# Patient Record
Sex: FEMALE | Race: WHITE | NOT HISPANIC OR LATINO | Employment: OTHER | URBAN - METROPOLITAN AREA
[De-identification: names, ages, dates, MRNs, and addresses within clinical notes are randomized per-mention and may not be internally consistent; named-entity substitution may affect disease eponyms.]

---

## 2017-07-31 ENCOUNTER — ALLSCRIPTS OFFICE VISIT (OUTPATIENT)
Dept: OTHER | Facility: OTHER | Age: 82
End: 2017-07-31

## 2017-07-31 LAB
BILIRUB UR QL STRIP: NEGATIVE
CLARITY UR: NORMAL
COLOR UR: YELLOW
GLUCOSE (HISTORICAL): NEGATIVE
HGB UR QL STRIP.AUTO: NORMAL
KETONES UR STRIP-MCNC: NEGATIVE MG/DL
LEUKOCYTE ESTERASE UR QL STRIP: NORMAL
NITRITE UR QL STRIP: NEGATIVE
PH UR STRIP.AUTO: 7 [PH]
PROT UR STRIP-MCNC: NEGATIVE MG/DL
SP GR UR STRIP.AUTO: 1.01
UROBILINOGEN UR QL STRIP.AUTO: 0.2

## 2018-01-13 VITALS
WEIGHT: 164 LBS | HEIGHT: 66 IN | BODY MASS INDEX: 26.36 KG/M2 | DIASTOLIC BLOOD PRESSURE: 58 MMHG | SYSTOLIC BLOOD PRESSURE: 134 MMHG

## 2021-01-17 ENCOUNTER — TELEPHONE (OUTPATIENT)
Dept: OTHER | Facility: OTHER | Age: 86
End: 2021-01-17

## 2021-01-18 ENCOUNTER — NURSING HOME VISIT (OUTPATIENT)
Dept: GERIATRICS | Facility: OTHER | Age: 86
End: 2021-01-18
Payer: MEDICARE

## 2021-01-18 DIAGNOSIS — M19.011 BILATERAL SHOULDER REGION ARTHRITIS: ICD-10-CM

## 2021-01-18 DIAGNOSIS — N17.9 AKI (ACUTE KIDNEY INJURY) (HCC): ICD-10-CM

## 2021-01-18 DIAGNOSIS — N12 PYELONEPHRITIS: ICD-10-CM

## 2021-01-18 DIAGNOSIS — M19.012 BILATERAL SHOULDER REGION ARTHRITIS: ICD-10-CM

## 2021-01-18 DIAGNOSIS — S42.201D CLOSED FRACTURE OF PROXIMAL END OF RIGHT HUMERUS WITH ROUTINE HEALING, UNSPECIFIED FRACTURE MORPHOLOGY, SUBSEQUENT ENCOUNTER: Primary | ICD-10-CM

## 2021-01-18 DIAGNOSIS — N18.32 STAGE 3B CHRONIC KIDNEY DISEASE (HCC): ICD-10-CM

## 2021-01-18 DIAGNOSIS — D64.9 ANEMIA, UNSPECIFIED TYPE: ICD-10-CM

## 2021-01-18 DIAGNOSIS — K21.9 GASTROESOPHAGEAL REFLUX DISEASE, UNSPECIFIED WHETHER ESOPHAGITIS PRESENT: ICD-10-CM

## 2021-01-18 DIAGNOSIS — C92.10 CML (CHRONIC MYELOCYTIC LEUKEMIA) (HCC): ICD-10-CM

## 2021-01-18 DIAGNOSIS — N13.5 URETERAL STRICTURE, RIGHT: ICD-10-CM

## 2021-01-18 PROBLEM — R60.0 EDEMA OF BOTH LEGS: Status: ACTIVE | Noted: 2018-12-17

## 2021-01-18 PROBLEM — K59.1 FUNCTIONAL DIARRHEA: Status: ACTIVE | Noted: 2020-04-02

## 2021-01-18 PROBLEM — G89.29 CHRONIC PAIN OF BOTH SHOULDERS: Status: ACTIVE | Noted: 2018-04-02

## 2021-01-18 PROBLEM — M25.512 CHRONIC PAIN OF BOTH SHOULDERS: Status: ACTIVE | Noted: 2018-04-02

## 2021-01-18 PROBLEM — N30.20 CHRONIC CYSTITIS: Status: ACTIVE | Noted: 2017-07-31

## 2021-01-18 PROBLEM — M25.511 CHRONIC PAIN OF BOTH SHOULDERS: Status: ACTIVE | Noted: 2018-04-02

## 2021-01-18 PROBLEM — F41.8 SITUATIONAL ANXIETY: Status: ACTIVE | Noted: 2017-09-01

## 2021-01-18 PROBLEM — N18.31 CHRONIC KIDNEY DISEASE (CKD) STAGE G3A/A1, MODERATELY DECREASED GLOMERULAR FILTRATION RATE (GFR) BETWEEN 45-59 ML/MIN/1.73 SQUARE METER AND ALBUMINURIA CREATININE RATIO LESS THAN 30 MG/G (HCC): Status: ACTIVE | Noted: 2018-05-27

## 2021-01-18 PROBLEM — M51.36 LUMBAR DEGENERATIVE DISC DISEASE: Status: ACTIVE | Noted: 2018-05-27

## 2021-01-18 PROBLEM — S42.309A HUMERUS FRACTURE: Status: ACTIVE | Noted: 2020-12-30

## 2021-01-18 PROBLEM — M54.50 BACK PAIN AT L4-L5 LEVEL: Status: ACTIVE | Noted: 2018-05-27

## 2021-01-18 PROCEDURE — 99306 1ST NF CARE HIGH MDM 50: CPT | Performed by: INTERNAL MEDICINE

## 2021-01-18 RX ORDER — FERROUS SULFATE 325(65) MG
325 TABLET ORAL EVERY OTHER DAY
COMMUNITY
Start: 2020-12-11 | End: 2021-03-11

## 2021-01-18 RX ORDER — CETIRIZINE HYDROCHLORIDE 10 MG/1
10 TABLET ORAL DAILY
COMMUNITY

## 2021-01-18 RX ORDER — DIPHENOXYLATE HYDROCHLORIDE AND ATROPINE SULFATE 2.5; .025 MG/1; MG/1
1 TABLET ORAL 4 TIMES DAILY PRN
COMMUNITY
End: 2021-02-08

## 2021-01-18 RX ORDER — OXYCODONE HYDROCHLORIDE 5 MG/1
2.5 TABLET ORAL EVERY 6 HOURS PRN
COMMUNITY
End: 2021-02-01

## 2021-01-18 RX ORDER — AMOXICILLIN 250 MG
2 CAPSULE ORAL DAILY PRN
COMMUNITY

## 2021-01-18 RX ORDER — MULTIVIT WITH MINERALS/LUTEIN
250 TABLET ORAL 2 TIMES DAILY
COMMUNITY
Start: 2021-01-16 | End: 2022-01-16

## 2021-01-18 RX ORDER — GABAPENTIN 100 MG/1
100 CAPSULE ORAL 3 TIMES DAILY
COMMUNITY
Start: 2020-10-20 | End: 2021-10-20

## 2021-01-18 RX ORDER — FLUOCINOLONE ACETONIDE 0.1 MG/ML
SOLUTION TOPICAL 2 TIMES DAILY
COMMUNITY

## 2021-01-18 RX ORDER — CHOLECALCIFEROL (VITAMIN D3) 10 MCG
1 TABLET ORAL DAILY
COMMUNITY

## 2021-01-18 RX ORDER — DIPHENOXYLATE HYDROCHLORIDE AND ATROPINE SULFATE 2.5; .025 MG/1; MG/1
1 TABLET ORAL DAILY
COMMUNITY

## 2021-01-18 RX ORDER — IMATINIB MESYLATE 100 MG/1
2 TABLET, FILM COATED ORAL DAILY
COMMUNITY

## 2021-01-18 RX ORDER — OMEPRAZOLE 20 MG/1
20 CAPSULE, DELAYED RELEASE ORAL DAILY
COMMUNITY

## 2021-01-18 RX ORDER — CARBOXYMETHYLCELLULOSE SODIUM 5 MG/ML
1 SOLUTION/ DROPS OPHTHALMIC 3 TIMES DAILY PRN
COMMUNITY

## 2021-01-18 NOTE — ASSESSMENT & PLAN NOTE
Possibly multifactorial  Received 2 units of transfusion and hb on discharge was 7 9  Will repeat and monitor levels  No current signs of bleeding

## 2021-01-18 NOTE — ASSESSMENT & PLAN NOTE
Had stent placed which also may have been cause of worsening kidney function  Follow up scheduled with urology

## 2021-01-18 NOTE — ASSESSMENT & PLAN NOTE
Cr Baseline was stated to be 1 2-1 5 but in the hospital was ranging in the 2-3 but discharged with 2 06   Will repeat to monitor

## 2021-01-18 NOTE — PROGRESS NOTES
[facility] notes  SHORT TERM REHAB       NAME: Meena Mast  AGE: 80 y o  SEX: female    DATE OF ENCOUNTER: 1/18/2021    Assessment and Plan   Humerus fracture  Non WB for now  Continue with pain management  Continue follow up as scheduled with orthopedics  Rehab consult  Continue monitoring for pain     CKD (chronic kidney disease)  Cr Baseline was stated to be 1 2-1 5 but in the hospital was ranging in the 2-3 but discharged with 2 06   Will repeat to monitor    DEJA (acute kidney injury) ([facility] [address] )  Will repeat to monitor since decline from baseline to 2 06    Pyelonephritis  Treated with antibiotics in the hospital and completed course  Continue monitoring symptoms     Ureteral stricture, right  Had stent placed which also may have been cause of worsening kidney function  Follow up scheduled with urology     CML (chronic myelocytic leukemia) ([facility] [address] )  Continue current meds  Continue follow up as scheduled with oncology    Anemia  Possibly multifactorial  Received 2 units of transfusion and hb on discharge was 7 9  Will repeat and monitor levels  No current signs of bleeding    Bilateral shoulder region arthritis  Has limited range of motion  Continue current pain meds  Therapy to evaluate    Esophageal reflux  Continue on PPI   Continue monitoring symptoms       Chief Complaint     Right arm pain    History of Present Illness     79 yo female seen for admission to [address] [place] [address] after hospitalization  Patient stated that she was in the elevator when she lost her balance and fell  She then was not able to get up and was having severe pain in the right arm  She pressed her life alert at which point police and ambulance came and took her to the hospital  She was then found to have a fracture and managed for it  She then was also found to have worsening kidney function when they found pyelonephritis and ureter stricture which she received antibiotics for along with a stent placement   She also was found to be more anemic then her baseline requiring 2 units of blood  Reviewed CT of the abdomen, right shoulder and lab reports from the hospital records  PMHx     Past Medical History:   Diagnosis Date    Anemia     Aortic stenosis     Arthritis     CML (chronic myelocytic leukemia) (HCC)     DJD (degenerative joint disease)     GERD (gastroesophageal reflux disease)     Hepatic steatosis     Hyperlipidemia     Hypertension     Kidney stone     Sacral fracture (HCC)     Thyroid nodule      Past Surgical History:   Procedure Laterality Date    CATARACT EXTRACTION      KNEE ARTHROPLASTY Bilateral      Family History   Problem Relation Age of Onset    Heart disease Father     Hypertension Brother      Social History     Socioeconomic History    Marital status:       Spouse name: None    Number of children: None    Years of education: None    Highest education level: None   Occupational History    None   Social Needs    Financial resource strain: None    Food insecurity     Worry: None     Inability: None    Transportation needs     Medical: None     Non-medical: None   Tobacco Use    Smoking status: Never Smoker    Smokeless tobacco: Never Used   Substance and Sexual Activity    Alcohol use: Never     Frequency: Never    Drug use: Never    Sexual activity: Never   Lifestyle    Physical activity     Days per week: None     Minutes per session: None    Stress: None   Relationships    Social connections     Talks on phone: None     Gets together: None     Attends Voodoo service: None     Active member of club or organization: None     Attends meetings of clubs or organizations: None     Relationship status: None    Intimate partner violence     Fear of current or ex partner: None     Emotionally abused: None     Physically abused: None     Forced sexual activity: None   Other Topics Concern    None   Social History Narrative    None     Allergies   Allergen Reactions    Nitrofurantoin Other (See Comments)    Sulfamethoxazole-Trimethoprim Other (See Comments)     arf     Amoxicillin-Pot Clavulanate Rash    Levofloxacin Rash       Review of Systems     Pain in the right arm  Constipated  Decreased ROM of both shoulders  All other review of system negative        Objective   Vital signs:  BP: 154/92  HR: 100  RR: 16  TEMP: 98 4F  SAT : 99% on RA    PHYSICAL EXAM:  GENERAL: no acute distress  SKIN: warm, dry, no rash, no cyanosis  HEENT: normocephalic, atraumatic, no JVD, no Thyromegaly, no lymphadenopathy  LUNGS: CTA, no wheezing, no rales, expanded equally, no chest tenderness   HEART: normal rhythm, normal rate, systolic murmur, no gallop  ABDOMEN: soft non tender non distended bs+, no guarding or rebound tenderness  :  no suprapubic tenderness  MUSCULOSKELETAL: sling on the right arm, strength about 4+/5 all extremities except right arm not examined due to fracture, decreased ROM of both shoulders, no edema, no calf tenderness  NEUROLOGY: awake, alert, Ox3, able to recall 3/3 object  CN2-12 intact  PSYCH: cooperative, pleasant  Pertinent Laboratory/Diagnostic Studies:  Recent labs and diagnostic tests reviewed in nursing home EMR    Current Medications   Medications reviewed and signed off on nursing home EMR

## 2021-01-18 NOTE — ASSESSMENT & PLAN NOTE
Non WB for now  Continue with pain management  Continue follow up as scheduled with orthopedics  Rehab consult  Continue monitoring for pain

## 2021-01-25 ENCOUNTER — NURSING HOME VISIT (OUTPATIENT)
Dept: GERIATRICS | Facility: OTHER | Age: 86
End: 2021-01-25
Payer: MEDICARE

## 2021-01-25 DIAGNOSIS — N39.0 RECURRENT UTI: ICD-10-CM

## 2021-01-25 DIAGNOSIS — D64.9 ANEMIA, UNSPECIFIED TYPE: ICD-10-CM

## 2021-01-25 DIAGNOSIS — S42.201D CLOSED FRACTURE OF PROXIMAL END OF RIGHT HUMERUS WITH ROUTINE HEALING, UNSPECIFIED FRACTURE MORPHOLOGY, SUBSEQUENT ENCOUNTER: Primary | ICD-10-CM

## 2021-01-25 DIAGNOSIS — K59.1 FUNCTIONAL DIARRHEA: ICD-10-CM

## 2021-01-25 PROCEDURE — 99309 SBSQ NF CARE MODERATE MDM 30: CPT | Performed by: INTERNAL MEDICINE

## 2021-01-25 RX ORDER — POLYETHYLENE GLYCOL 3350 17 G/17G
17 POWDER, FOR SOLUTION ORAL DAILY PRN
COMMUNITY

## 2021-01-25 NOTE — ASSESSMENT & PLAN NOTE
Hb is 8 1 on 1/25/21 will repeat in about 2 weeks if patient still here hb at the hospital was 7 9 after transfusion  Continue iron supplement

## 2021-01-25 NOTE — ASSESSMENT & PLAN NOTE
Repeat xray scheduled and follow up scheduled for this week with orthopedics  Continue current meds   Continue with therapy

## 2021-01-25 NOTE — PROGRESS NOTES
[facility] [address] notes  SHORT TERM REHAB       NAME: Suellen Mast  AGE: 80 y o  SEX: female    DATE OF ENCOUNTER: 1/25/2021    Assessment and Plan   Humerus fracture  Repeat xray scheduled and follow up scheduled for this week with orthopedics  Continue current meds   Continue with therapy       Recurrent UTI  Complaints of burning and frequency  Will get a UA and urine culture and encourage to take in more fluids    Anemia  Hb is 8 1 on 1/25/21 will repeat in about 2 weeks if patient still here hb at the hospital was 7 9 after transfusion  Continue iron supplement     Functional diarrhea  Will change senna to prn   Continue monitoring symptoms       Chief Complaint     Burning and frequency of urine    History of Present Illness     81 yo female seen for follow up  Patient continues with therapy  Patient seen for right humerus fracture, anemia, diarrhea and urinary burning  Reviewed nursing notes since last visit  PMHx     Past Medical History:   Diagnosis Date    Anemia     Aortic stenosis     Arthritis     CML (chronic myelocytic leukemia) (HCC)     DJD (degenerative joint disease)     GERD (gastroesophageal reflux disease)     Hepatic steatosis     Hyperlipidemia     Hypertension     Kidney stone     Sacral fracture (HCC)     Thyroid nodule      Past Surgical History:   Procedure Laterality Date    CATARACT EXTRACTION      KNEE ARTHROPLASTY Bilateral      Family History   Problem Relation Age of Onset    Heart disease Father     Hypertension Brother      Social History     Socioeconomic History    Marital status:       Spouse name: Not on file    Number of children: Not on file    Years of education: Not on file    Highest education level: Not on file   Occupational History    Not on file   Social Needs    Financial resource strain: Not on file    Food insecurity     Worry: Not on file     Inability: Not on file    Transportation needs     Medical: Not on file Non-medical: Not on file   Tobacco Use    Smoking status: Never Smoker    Smokeless tobacco: Never Used   Substance and Sexual Activity    Alcohol use: Never     Frequency: Never    Drug use: Never    Sexual activity: Never   Lifestyle    Physical activity     Days per week: Not on file     Minutes per session: Not on file    Stress: Not on file   Relationships    Social connections     Talks on phone: Not on file     Gets together: Not on file     Attends [employer] service: Not on file     Active member of club or organization: Not on file     Attends meetings of clubs or organizations: Not on file     Relationship status: Not on file    Intimate partner violence     Fear of current or ex partner: Not on file     Emotionally abused: Not on file     Physically abused: Not on file     Forced sexual activity: Not on file   Other Topics Concern    Not on file   Social History Narrative    Not on file     Allergies   Allergen Reactions    Nitrofurantoin Other (See Comments)    Sulfamethoxazole-Trimethoprim Other (See Comments)     arf     Amoxicillin-Pot Clavulanate Rash    Levofloxacin Rash       Review of Systems     Some pain in the right arm  Loose stool   Urinary burning   All other review of system negative        Objective   Vital signs:  BP: 142/66  HR: 92  RR: 16  TEMP: 98 5F    PHYSICAL EXAM:  GENERAL: no acute distress  SKIN: warm, dry, no rash, no cyanosis  HEENT: normocephalic, atraumatic, no JVD, no Thyromegaly, no lymphadenopathy  LUNGS: CTA, no wheezing, no rales, expanded equally, no chest tenderness   HEART: normal rhythm, normal rate, systolic murmur, no gallop  ABDOMEN: soft non tender non distended bs+, no guarding or rebound tenderness  :  no suprapubic tenderness  MUSCULOSKELETAL: right arm in sling, strength about 4+/5 all extremities except right arm not examined due to fracture, decreased ROM both shoulders, no calf tenderness  NEUROLOGY: awake, alert, Ox3, CN2-12 intact  PSYCH: cooperative, pleasant         Pertinent Laboratory/Diagnostic Studies:  Recent labs and diagnostic tests reviewed in nursing home EMR    Current Medications   Medications reviewed

## 2021-01-25 NOTE — ASSESSMENT & PLAN NOTE
Complaints of burning and frequency  Will get a UA and urine culture and encourage to take in more fluids

## 2021-01-30 ENCOUNTER — TELEPHONE (OUTPATIENT)
Dept: OTHER | Facility: OTHER | Age: 86
End: 2021-01-30

## 2021-02-01 ENCOUNTER — NURSING HOME VISIT (OUTPATIENT)
Dept: GERIATRICS | Facility: OTHER | Age: 86
End: 2021-02-01
Payer: MEDICARE

## 2021-02-01 DIAGNOSIS — N30.00 ACUTE CYSTITIS WITHOUT HEMATURIA: Primary | ICD-10-CM

## 2021-02-01 DIAGNOSIS — S42.201D CLOSED FRACTURE OF PROXIMAL END OF RIGHT HUMERUS WITH ROUTINE HEALING, UNSPECIFIED FRACTURE MORPHOLOGY, SUBSEQUENT ENCOUNTER: ICD-10-CM

## 2021-02-01 DIAGNOSIS — D64.9 ANEMIA, UNSPECIFIED TYPE: ICD-10-CM

## 2021-02-01 PROCEDURE — 99309 SBSQ NF CARE MODERATE MDM 30: CPT | Performed by: INTERNAL MEDICINE

## 2021-02-01 RX ORDER — LINEZOLID 600 MG/1
600 TABLET, FILM COATED ORAL EVERY 12 HOURS SCHEDULED
COMMUNITY
End: 2021-02-03

## 2021-02-01 RX ORDER — PHENAZOPYRIDINE HYDROCHLORIDE 100 MG/1
100 TABLET, FILM COATED ORAL 3 TIMES DAILY PRN
COMMUNITY
End: 2021-02-02

## 2021-02-01 NOTE — ASSESSMENT & PLAN NOTE
Continues to have some pain   Will give a one time dose of ibuprofen 800mg  Continue tylenol   Family wants to avoid narcotics  Continue with therapy   Reviewed ortho follow note  WBAT and ok for all ROM, discontinue sling  R humerus MRI with and wiouth contrast to rule out pathological fracture

## 2021-02-01 NOTE — PROGRESS NOTES
[facility] [address] notes  SHORT TERM REHAB       NAME: Deannie Phoenix Billger  AGE: 80 y o  SEX: female    DATE OF ENCOUNTER: 2/1/2021    Assessment and Plan   Acute cystitis without hematuria  Started on linezolid till 2/3/21  Also started on pyridium till 2/2/21  Patient states symptoms are improved  Humerus fracture  Continues to have some pain   Will give a one time dose of ibuprofen 800mg  Continue tylenol   Family wants to avoid narcotics  Continue with therapy   Reviewed ortho follow note  WBAT and ok for all ROM, discontinue sling  R humerus MRI with and wiouth contrast to rule out pathological fracture    Anemia  Repeat CBC next week          Chief Complaint     Arm pain     History of Present Illness     79 yo female seen for follow up  Patient seen for her humerus fracture, anemia and UTI  Reviewed nursing notes since last visit  PMHx     Past Medical History:   Diagnosis Date    Anemia     Aortic stenosis     Arthritis     CML (chronic myelocytic leukemia) (HCC)     DJD (degenerative joint disease)     GERD (gastroesophageal reflux disease)     Hepatic steatosis     Hyperlipidemia     Hypertension     Kidney stone     Sacral fracture (HCC)     Thyroid nodule      Past Surgical History:   Procedure Laterality Date    CATARACT EXTRACTION      KNEE ARTHROPLASTY Bilateral      Family History   Problem Relation Age of Onset    Heart disease Father     Hypertension Brother      Social History     Socioeconomic History    Marital status:       Spouse name: Not on file    Number of children: Not on file    Years of education: Not on file    Highest education level: Not on file   Occupational History    Not on file   Social Needs    Financial resource strain: Not on file    Food insecurity     Worry: Not on file     Inability: Not on file    Transportation needs     Medical: Not on file     Non-medical: Not on file   Tobacco Use    Smoking status: Never Smoker    Smokeless tobacco: Never Used   Substance and Sexual Activity    Alcohol use: Never     Frequency: Never    Drug use: Never    Sexual activity: Never   Lifestyle    Physical activity     Days per week: Not on file     Minutes per session: Not on file    Stress: Not on file   Relationships    Social connections     Talks on phone: Not on file     Gets together: Not on file     Attends Rastafarian service: Not on file     Active member of club or organization: Not on file     Attends meetings of clubs or organizations: Not on file     Relationship status: Not on file    Intimate partner violence     Fear of current or ex partner: Not on file     Emotionally abused: Not on file     Physically abused: Not on file     Forced sexual activity: Not on file   Other Topics Concern    Not on file   Social History Narrative    Not on file     Allergies   Allergen Reactions    Nitrofurantoin Other (See Comments)    Sulfamethoxazole-Trimethoprim Other (See Comments)     arf     Amoxicillin-Pot Clavulanate Rash    Levofloxacin Rash       Review of Systems     Right arm pain   All other review of system negative        Objective   Vital signs:  BP: 120/68  HR: 80  RR: 18  TEMP: 98 2F    PHYSICAL EXAM:  GENERAL: no acute distress  SKIN: warm, dry, no rash, no cyanosis  HEENT: normocephalic, atraumatic, no JVD, no Thyromegaly, no lymphadenopathy  LUNGS: CTA, no wheezing, no rales, expanded equally, no chest tenderness   HEART: normal rhythm, normal rate, systolic murmur, no gallop  ABDOMEN: soft non tender non distended bs+, no guarding or rebound tenderness  :  no suprapubic tenderness  MUSCULOSKELETAL: strength about 4+/5 all extremities except decreased right arm and also decreased ROM of the shoulders, no calf tenderness  NEUROLOGY: awake, alert, Ox3, CN2-12 intact  PSYCH: cooperative, pleasant         Pertinent Laboratory/Diagnostic Studies:  Recent labs and diagnostic tests reviewed in nursing home EMR    Current Medications   Medications reviewed

## 2021-02-01 NOTE — ASSESSMENT & PLAN NOTE
Started on linezolid till 2/3/21  Also started on pyridium till 2/2/21  Patient states symptoms are improved

## 2021-02-07 ENCOUNTER — TELEPHONE (OUTPATIENT)
Dept: OTHER | Facility: OTHER | Age: 86
End: 2021-02-07

## 2021-02-08 ENCOUNTER — NURSING HOME VISIT (OUTPATIENT)
Dept: GERIATRICS | Facility: OTHER | Age: 86
End: 2021-02-08
Payer: MEDICARE

## 2021-02-08 DIAGNOSIS — S42.201D CLOSED FRACTURE OF PROXIMAL END OF RIGHT HUMERUS WITH ROUTINE HEALING, UNSPECIFIED FRACTURE MORPHOLOGY, SUBSEQUENT ENCOUNTER: Primary | ICD-10-CM

## 2021-02-08 DIAGNOSIS — A04.72 CLOSTRIDIUM DIFFICILE DIARRHEA: ICD-10-CM

## 2021-02-08 DIAGNOSIS — D64.9 ANEMIA, UNSPECIFIED TYPE: ICD-10-CM

## 2021-02-08 PROCEDURE — 99309 SBSQ NF CARE MODERATE MDM 30: CPT | Performed by: INTERNAL MEDICINE

## 2021-02-08 RX ORDER — VANCOMYCIN HYDROCHLORIDE 125 MG/1
125 CAPSULE ORAL 4 TIMES DAILY
COMMUNITY
End: 2021-02-18

## 2021-02-08 NOTE — ASSESSMENT & PLAN NOTE
Hb 7 7 today has been lower in the hospital and discharged at 7 9  Will repeat in a week if patient is still here  Multifactorial   Continue iron supplement

## 2021-02-08 NOTE — ASSESSMENT & PLAN NOTE
Positive  Started on vancomycin   Continue monitoring for symptoms  Patient does have antibiotic use history and hospitalization

## 2021-02-08 NOTE — PROGRESS NOTES
[facility] [address] notes  SHORT TERM REHAB       NAME: Vijaya Mast  AGE: 80 y o  SEX: female    DATE OF ENCOUNTER: 2/8/2021    Assessment and Plan   Humerus fracture  Pain controlled  Continue with current meds  Continues with therapy  Continue ortho follow up as scheduled, also wanted MRI prior to next visit  Continue monitoring for worsening symptoms     Clostridium difficile diarrhea  Positive  Started on vancomycin   Continue monitoring for symptoms  Patient does have antibiotic use history and hospitalization     Anemia  Hb 7 7 today has been lower in the hospital and discharged at 7 9  Will repeat in a week if patient is still here  Multifactorial   Continue iron supplement      Chief Complaint     No new complaints     History of Present Illness     79 yo female seen for follow up  Patient is seen for right humerus fracture, anemia and cdiff  Reviewed nursing notes since last visit  PMHx     Past Medical History:   Diagnosis Date    Anemia     Aortic stenosis     Arthritis     CML (chronic myelocytic leukemia) (HCC)     DJD (degenerative joint disease)     GERD (gastroesophageal reflux disease)     Hepatic steatosis     Hyperlipidemia     Hypertension     Kidney stone     Sacral fracture (HCC)     Thyroid nodule      Past Surgical History:   Procedure Laterality Date    CATARACT EXTRACTION      KNEE ARTHROPLASTY Bilateral      Family History   Problem Relation Age of Onset    Heart disease Father     Hypertension Brother      Social History     Socioeconomic History    Marital status:       Spouse name: Not on file    Number of children: Not on file    Years of education: Not on file    Highest education level: Not on file   Occupational History    Not on file   Social Needs    Financial resource strain: Not on file    Food insecurity     Worry: Not on file     Inability: Not on file    Transportation needs     Medical: Not on file     Non-medical: Not on file   Tobacco Use    Smoking status: Never Smoker    Smokeless tobacco: Never Used   Substance and Sexual Activity    Alcohol use: Never     Frequency: Never    Drug use: Never    Sexual activity: Never   Lifestyle    Physical activity     Days per week: Not on file     Minutes per session: Not on file    Stress: Not on file   Relationships    Social connections     Talks on phone: Not on file     Gets together: Not on file     Attends Orthodox service: Not on file     Active member of club or organization: Not on file     Attends meetings of clubs or organizations: Not on file     Relationship status: Not on file    Intimate partner violence     Fear of current or ex partner: Not on file     Emotionally abused: Not on file     Physically abused: Not on file     Forced sexual activity: Not on file   Other Topics Concern    Not on file   Social History Narrative    Not on file     Allergies   Allergen Reactions    Nitrofurantoin Other (See Comments)    Sulfamethoxazole-Trimethoprim Other (See Comments)     arf     Amoxicillin-Pot Clavulanate Rash    Levofloxacin Rash       Review of Systems     Pain is better but still there  Diarrhea improved  All other review of system negative        Objective   Vital signs:  BP: 114/64  HR: 100  RR: 18  TEMP: 98 0F    PHYSICAL EXAM:  GENERAL: no acute distress  SKIN: warm, dry, no rash, no cyanosis  HEENT: normocephalic, atraumatic, no JVD, no Thyromegaly, no lymphadenopathy  LUNGS: CTA, no wheezing, no rales, expanded equally, no chest tenderness   HEART: normal rhythm, normal rate, systolic murmur, no gallop  ABDOMEN: soft non tender non distended bs+, no guarding or rebound tenderness  :  no suprapubic tenderness  MUSCULOSKELETAL: strength about 4+/5 all extremities except decreased right arm and ROM decreased in both shoulders, no calf tenderness  NEUROLOGY: awake, alert, Ox3, CN2-12 intact  PSYCH: cooperative, pleasant         Pertinent Laboratory/Diagnostic Studies:  Recent labs and diagnostic tests reviewed in nursing home EMR    Current Medications   Medications reviewed

## 2021-02-08 NOTE — ASSESSMENT & PLAN NOTE
Pain controlled  Continue with current meds  Continues with therapy  Continue ortho follow up as scheduled, also wanted MRI prior to next visit  Continue monitoring for worsening symptoms

## 2021-02-16 ENCOUNTER — NURSING HOME VISIT (OUTPATIENT)
Dept: GERIATRICS | Facility: OTHER | Age: 86
End: 2021-02-16
Payer: MEDICARE

## 2021-02-16 DIAGNOSIS — K21.9 GASTROESOPHAGEAL REFLUX DISEASE, UNSPECIFIED WHETHER ESOPHAGITIS PRESENT: ICD-10-CM

## 2021-02-16 DIAGNOSIS — D64.9 ANEMIA, UNSPECIFIED TYPE: ICD-10-CM

## 2021-02-16 DIAGNOSIS — N18.32 STAGE 3B CHRONIC KIDNEY DISEASE (HCC): ICD-10-CM

## 2021-02-16 DIAGNOSIS — R55 VASOVAGAL EPISODE: Primary | ICD-10-CM

## 2021-02-16 DIAGNOSIS — A04.72 CLOSTRIDIUM DIFFICILE DIARRHEA: ICD-10-CM

## 2021-02-16 DIAGNOSIS — S42.201D CLOSED FRACTURE OF PROXIMAL END OF RIGHT HUMERUS WITH ROUTINE HEALING, UNSPECIFIED FRACTURE MORPHOLOGY, SUBSEQUENT ENCOUNTER: ICD-10-CM

## 2021-02-16 DIAGNOSIS — C92.10 CML (CHRONIC MYELOCYTIC LEUKEMIA) (HCC): ICD-10-CM

## 2021-02-16 DIAGNOSIS — N13.5 URETERAL STRICTURE, RIGHT: ICD-10-CM

## 2021-02-16 PROCEDURE — 99316 NF DSCHRG MGMT 30 MIN+: CPT | Performed by: INTERNAL MEDICINE

## 2021-02-16 RX ORDER — DIPHENOXYLATE HCL/ATROPINE 2.5-.025/5
5 LIQUID (ML) ORAL 4 TIMES DAILY PRN
COMMUNITY

## 2021-02-16 NOTE — ASSESSMENT & PLAN NOTE
Hb is at 7 9 stable   Continue iron supplement   Continue monitoring CBC with oncology  Multifactorial

## 2021-02-16 NOTE — ASSESSMENT & PLAN NOTE
Resolved rapidly   Occurred when she was having a bowel movement yesterday  Did not loose consciousness was still awake stated it was like lightheaded and hazy  Vitals have been stable  Labs checked have been no acute changes from prior labs    No more symptoms   Follow up with PCP on discharge

## 2021-02-16 NOTE — ASSESSMENT & PLAN NOTE
Will give vanco to complete the course  Continue monitoring for reoccurrence after completion prior to using lomitil or any other meds

## 2021-02-16 NOTE — ASSESSMENT & PLAN NOTE
Pain controlled  Has on used oxycodone for the past several days  Discontinue oxycodone  Continue activity as tolerable  Continue follow up as scheduled  Reviewed therapy notes

## 2021-02-16 NOTE — PROGRESS NOTES
[facility] notes  SHORT TERM REHAB Discharge summary      NAME: Junior Hernandez  AGE: 80 y o  SEX: female    DATE OF ENCOUNTER: 2/16/2021    Assessment and Plan   Vasovagal episode  Resolved rapidly   Occurred when she was having a bowel movement yesterday  Did not loose consciousness was still awake stated it was like lightheaded and hazy  Vitals have been stable  Labs checked have been no acute changes from prior labs  No more symptoms   Follow up with PCP on discharge    Humerus fracture  Pain controlled  Has on used oxycodone for the past several days  Discontinue oxycodone  Continue activity as tolerable  Continue follow up as scheduled  Reviewed therapy notes    CKD (chronic kidney disease)  Cr done today 2 27 about baseline  Continue follow up with PCP to continue monitoring     Anemia  Hb is at 7 9 stable   Continue iron supplement   Continue monitoring CBC with oncology  Multifactorial     Clostridium difficile diarrhea  Will give vanco to complete the course  Continue monitoring for reoccurrence after completion prior to using lomitil or any other meds     Ureteral stricture, right  Completed antibiotics in the hospital for pyelonephritis  S/p stent   Continue follow up with urology as scheduled  Continue monitoring for any further symptoms      CML (chronic myelocytic leukemia) ([facility] [address] )  Continue follow up with oncology  Continue current meds    Esophageal reflux  Continue on PPI   Continue monitoring symptoms       Chief Complaint     No new complaints    History of Present Illness     81 yo female seen for discharge  [facility] right humerus fracture for therapy  Her stay was complicated by diarrhea which she was found to have Cdiff and also treatment for UTI  She also had an episode of vasovagal with no other significant findings  Patient now ready for discharged  Reviewed nursing notes since last visit   Also reviewed therapy notes since last visit      PMHx     Past Medical History:   Diagnosis Date    Anemia     Aortic stenosis     Arthritis     CML (chronic myelocytic leukemia) (HCC)     DJD (degenerative joint disease)     GERD (gastroesophageal reflux disease)     Hepatic steatosis     Hyperlipidemia     Hypertension     Kidney stone     Sacral fracture (HCC)     Thyroid nodule      Past Surgical History:   Procedure Laterality Date    CATARACT EXTRACTION      KNEE ARTHROPLASTY Bilateral      Family History   Problem Relation Age of Onset    Heart disease Father     Hypertension Brother      Social History     Socioeconomic History    Marital status:       Spouse name: Not on file    Number of children: Not on file    Years of education: Not on file    Highest education level: Not on file   Occupational History    Not on file   Social Needs    Financial resource strain: Not on file    Food insecurity     Worry: Not on file     Inability: Not on file    Transportation needs     Medical: Not on file     Non-medical: Not on file   Tobacco Use    Smoking status: Never Smoker    Smokeless tobacco: Never Used   Substance and Sexual Activity    Alcohol use: Never     Frequency: Never    Drug use: Never    Sexual activity: Never   Lifestyle    Physical activity     Days per week: Not on file     Minutes per session: Not on file    Stress: Not on file   Relationships    Social connections     Talks on phone: Not on file     Gets together: Not on file     Attends Jainism service: Not on file     Active member of club or organization: Not on file     Attends meetings of clubs or organizations: Not on file     Relationship status: Not on file    Intimate partner violence     Fear of current or ex partner: Not on file     Emotionally abused: Not on file     Physically abused: Not on file     Forced sexual activity: Not on file   Other Topics Concern    Not on file   Social History Narrative    Not on file     Allergies Allergen Reactions    Nitrofurantoin Other (See Comments)    Sulfamethoxazole-Trimethoprim Other (See Comments)     arf     Amoxicillin-Pot Clavulanate Rash    Levofloxacin Rash       Review of Systems     Denies any pain or shortness of breath  All other review of system negative      Objective   Vital signs:  BP: 117/46  HR: 100  RR: 18  TEMP: 98 2F    PHYSICAL EXAM:  GENERAL: no acute distress  SKIN: warm, dry, no rash, no cyanosis  HEENT: normocephalic, atraumatic, no JVD, no Thyromegaly, no lymphadenopathy  LUNGS: CTA, no wheezing, no rales, expanded equally, no chest tenderness   HEART: normal rhythm, normal rate, systolic murmur, no gallop  ABDOMEN: soft non tender non distended bs+, no guarding or rebound tenderness  :  no suprapubic tenderness  MUSCULOSKELETAL: strength about 4+/5 all extremities, decreased ROM of both shoulders, no calf tenderness  NEUROLOGY: awake, alert, Ox3, CN2-12 intact  PSYCH: cooperative, pleasant         Pertinent Laboratory/Diagnostic Studies:  Recent labs and diagnostic tests reviewed in nursing home EMR    Current Medications  Medications reviewed with patient/family  Prescriptions provided for medications needed    Prescriptions provided for services needed    Time spend on patient discharge 35 min

## 2021-02-16 NOTE — ASSESSMENT & PLAN NOTE
Completed antibiotics in the hospital for pyelonephritis  S/p stent   Continue follow up with urology as scheduled  Continue monitoring for any further symptoms

## 2023-04-27 NOTE — ASSESSMENT & PLAN NOTE
Continue current meds  Continue follow up as scheduled with oncology Patient Education     El ejercicio: cómo aumentar la intensidad  Se mosley estado ejercitando zunilda por lo menos 30 minutos shilpa todos los días de la semana. Ya es hora de pasar a la siguiente etapa: aumentar la intensidad. [place] significa practicarlas de reginaldo o más de las siguientes maneras:   · Más tiempo. Nilson ejercicio zunilda 30 minutos o más sin interrupciones.  · Mayor velocidad. Dé un paseo, corra o patine lo suficientemente rápido aris para aumentar moderadamente la frecuencia cardíaca, aris si hubiera caminado a paso enérgico para mansi el autobús.  · Más a menudo. Nilson la actividad de 4 a 6 veces por semana en lugar de 1 a 3 veces.    No solo con las clases de gimnasia  Sea creativo. Puede lograr marti metas en cuanto a la mini y el estado físico de muchas formas. Intente hacer algunas de estas actividades:   · Deportes en equipo, aris el baloncesto y el fútbol.  · Actividades sociales y recreativas, aris las excursiones o el baile.  · Ejercicios individuales, aris el ciclismo, la natación o el patinaje.  · Clases de ejercicio en grupos, aris la gimnasia aeróbica o el entrenamiento con pesas.  Verito la seguridad  Sea cual sea steward actividad, piense en lo que necesita hacer para protegerse:  · Póngase el equipo de seguridad y zapatos apropiados para steward actividad.  · [place] abundante cantidad de agua zunilda las sesiones de ejercicio y después de ellas.  · Póngase ropa de color martínez si se ejercita de noche.  · [place] tiempo para hacer ejercicios de calentamiento antes de ejercitarse y para bajar la intensidad después de la actividad física.  · Lleve siempre steward identificación si sale solo y asegúrese de que alguna persona sepa dónde se dirige.  · Si miriam a pie, camine en dirección contraria al tráfico (uriel en las esquinas de baja visibilidad). Si miriam en bicicleta, circule en dirección del tráfico y respete las leyes de tránsito.  Consejos para perseverar  · Consiga un compañero de ejercicios o un gimnasio o  centro de deportes. Si sabe que alguien lo está esperando, será menos probable que falte a marti sesiones.  · Prepare un bolso deportivo con todas las cosas que necesita para hacer ejercicio; de safia modo, lo tendrá listo para cuando usted lo esté.  · Elija reginaldo diversidad de actividades estimulantes para conservar el interés. ¡Diviértase!    ¿Qué lo ayudará a perseverar?  1.   2.   3.   © 6369-1760 [employer]. Todos los derechos reservados. Esta información no pretende sustituir la atención médica profesional. Sólo steward médico puede diagnosticar y tratar un problema de mini.            Adbry Counseling: I discussed with the patient the risks of tralokinumab including but not limited to eye infection and irritation, cold sores, injection site reactions, worsening of asthma, allergic reactions and increased risk of parasitic infection.  Live vaccines should be avoided while taking tralokinumab. The patient understands that monitoring is required and they must alert us or the primary physician if symptoms of infection or other concerning signs are noted.